# Patient Record
Sex: MALE | URBAN - METROPOLITAN AREA
[De-identification: names, ages, dates, MRNs, and addresses within clinical notes are randomized per-mention and may not be internally consistent; named-entity substitution may affect disease eponyms.]

---

## 2022-03-25 DIAGNOSIS — Z12.11 COLON CANCER SCREENING: Primary | ICD-10-CM

## 2022-08-22 DIAGNOSIS — Z12.11 COLON CANCER SCREENING: Primary | ICD-10-CM

## 2022-12-19 ENCOUNTER — TELEPHONE (OUTPATIENT)
Dept: GASTROENTEROLOGY | Facility: CLINIC | Age: 52
End: 2022-12-19
Payer: COMMERCIAL

## 2022-12-19 NOTE — TELEPHONE ENCOUNTER
----- Message from Dannielle Strauss sent at 2022  8:26 AM CDT -----  Regardinnd referral recd 08.15.22, 1st referral recd 22  2nd referral recd 08.15.22, 1st referral recd 22    Direct Schedule - New Pt NBP    Please call pt to schedule.    Thanks,  Dannielle

## 2023-01-06 ENCOUNTER — TELEPHONE (OUTPATIENT)
Dept: GASTROENTEROLOGY | Facility: CLINIC | Age: 53
End: 2023-01-06
Payer: COMMERCIAL

## 2023-01-06 VITALS — HEIGHT: 70 IN | BODY MASS INDEX: 28.63 KG/M2 | WEIGHT: 200 LBS

## 2023-01-06 NOTE — TELEPHONE ENCOUNTER
Reached out to patient and spoke with the pt wife and she will get back to me with the medication that pt takes, every thing is already charted in the chart. This is the pt first colonoscopy. Pt doesn't have hx of cpap, heart stent or walking problem. JEFFREY